# Patient Record
Sex: MALE | Race: WHITE | Employment: FULL TIME | URBAN - METROPOLITAN AREA
[De-identification: names, ages, dates, MRNs, and addresses within clinical notes are randomized per-mention and may not be internally consistent; named-entity substitution may affect disease eponyms.]

---

## 2024-04-30 ENCOUNTER — TELEPHONE (OUTPATIENT)
Dept: PULMONOLOGY | Age: 59
End: 2024-04-30

## 2024-04-30 NOTE — TELEPHONE ENCOUNTER
Note:  Outside referral from Dr. Marta Orellana for Mild Pulmonary Hypertension noted on TTE wo significant LHD. ECHO completed by [facility] cardiology. In house CXR report from 1/29/24 included. Need imaging. Ref scanned to chart. Sending to triage due to age and dx    3/29/2024 TTE:  Left Ventricle Normal left ventricular systolic function with a visually estimated EF of 60 - 65%. Left ventricle size is normal. Mildly increased wall thickness. Normal wall motion. Normal diastolic function.   Left Atrium Left atrium size is normal. LA Vol Index is  21 ml/m2.   Right Ventricle Right ventricle size is normal. Normal systolic function.   Right Atrium Right atrium size is normal.   Aortic Valve Valve structure is normal. No regurgitation. No stenosis.   Mitral Valve Valve structure is normal. Trace regurgitation. No stenosis noted.   Tricuspid Valve Valve structure is normal. Mild regurgitation. The estimated RVSP is 34 mmHg. No stenosis noted. Mildly elevated RVSP, consistent with mild pulmonary hypertension.   Pulmonic Valve Valve structure is normal. No regurgitation. No stenosis noted.   Aorta Normal sized aortic root and ascending aorta.   IVC/Hepatic Veins IVC diameter is less than or equal to 21 mm and decreases greater than 50% during inspiration; therefore the estimated right atrial pressure is normal (~3 mmHg). IVC size is normal.   Pericardium No pericardial effusion.     Left Ventricle: Normal left ventricular systolic function with a visually estimated EF of 60 - 65%. Left ventricle size is normal. Mildly increased wall thickness. Normal wall motion. Normal diastolic function.    Mitral Valve: Trace regurgitation.    Tricuspid Valve: Mild regurgitation. The estimated RVSP is 34 mmHg. Mildly elevated RVSP, consistent with mild pulmonary hypertension.    Image quality is good.     Referral note is scanned in to [facility].  I have faxed request to [facility] to obtain 1/31/2023 Calcium Scoring.  Denisha I spoke

## 2024-07-31 DIAGNOSIS — I27.20 PULMONARY HTN (HCC): Primary | ICD-10-CM

## 2024-08-05 ENCOUNTER — TELEPHONE (OUTPATIENT)
Dept: PULMONOLOGY | Age: 59
End: 2024-08-05

## 2024-08-05 NOTE — TELEPHONE ENCOUNTER
Called and spoke pt regarding the 6mins walk prior to his appt with Ms. Gaby Spencer on 8/7/24. Advice him to come 7:50am, he voices understanding. Clotilde GALAVIZ

## 2024-08-07 ENCOUNTER — OFFICE VISIT (OUTPATIENT)
Dept: PULMONOLOGY | Age: 59
End: 2024-08-07
Payer: COMMERCIAL

## 2024-08-07 ENCOUNTER — NURSE ONLY (OUTPATIENT)
Dept: PULMONOLOGY | Age: 59
End: 2024-08-07

## 2024-08-07 VITALS
WEIGHT: 206 LBS | SYSTOLIC BLOOD PRESSURE: 151 MMHG | TEMPERATURE: 97.2 F | DIASTOLIC BLOOD PRESSURE: 90 MMHG | HEIGHT: 66 IN | OXYGEN SATURATION: 96 % | BODY MASS INDEX: 33.11 KG/M2 | RESPIRATION RATE: 18 BRPM | HEART RATE: 77 BPM

## 2024-08-07 DIAGNOSIS — I27.20 MILD PULMONARY HYPERTENSION (HCC): Primary | ICD-10-CM

## 2024-08-07 DIAGNOSIS — I27.20 PULMONARY HYPERTENSION (HCC): Primary | ICD-10-CM

## 2024-08-07 DIAGNOSIS — R06.83 SNORING: ICD-10-CM

## 2024-08-07 DIAGNOSIS — R06.09 DYSPNEA ON EXERTION: ICD-10-CM

## 2024-08-07 LAB
EXPIRATORY TIME: NORMAL
FEF 25-75% %PRED-PRE: NORMAL
FEF 25-75% PRED: NORMAL
FEF 25-75-PRE: NORMAL
FEV1 %PRED-PRE: 100 %
FEV1 PRED: 3.2 L
FEV1/FVC %PRED-PRE: NORMAL
FEV1/FVC PRED: 113 %
FEV1/FVC: 88 %
FEV1: 3.2 L
FVC %PRED-PRE: 89 %
FVC PRED: 4.08 L
FVC: 3.62 L
PEF %PRED-PRE: NORMAL
PEF PRED: NORMAL
PEF-PRE: NORMAL

## 2024-08-07 PROCEDURE — 99204 OFFICE O/P NEW MOD 45 MIN: CPT | Performed by: STUDENT IN AN ORGANIZED HEALTH CARE EDUCATION/TRAINING PROGRAM

## 2024-08-07 PROCEDURE — 94010 BREATHING CAPACITY TEST: CPT | Performed by: STUDENT IN AN ORGANIZED HEALTH CARE EDUCATION/TRAINING PROGRAM

## 2024-08-07 RX ORDER — AZELASTINE HYDROCHLORIDE 137 UG/1
SPRAY, METERED NASAL
COMMUNITY
Start: 2024-06-27

## 2024-08-07 RX ORDER — OMEPRAZOLE 20 MG/1
CAPSULE, DELAYED RELEASE ORAL DAILY
COMMUNITY
Start: 2024-05-06

## 2024-08-07 RX ORDER — AMLODIPINE AND VALSARTAN 5; 160 MG/1; MG/1
1 TABLET ORAL DAILY
COMMUNITY

## 2024-08-07 ASSESSMENT — PULMONARY FUNCTION TESTS
FVC: 3.62
FEV1/FVC: 88
FEV1: 3.20
FVC_PREDICTED: 4.08
FEV1_PERCENT_PREDICTED_PRE: 100
FEV1/FVC_PREDICTED: 113
FVC_PERCENT_PREDICTED_PRE: 89
FEV1_PREDICTED: 3.20

## 2024-08-07 NOTE — PROGRESS NOTES
of virus then such as COVID or RSV. He has weight fluctuations up and down 20lbs over the last few years. He states he is not the heaviest he has been but definitely not the lightest. He states his wife tells him he snores. He denies any daytime sleepiness. No known SUSY or family hisotry of SUSY. Spirometry was normal. 6MWT was normal at 133% predicted. He denies any swelling or shortness of breath. He and his wife have just started going to the [facility]. He works in equipment sales and his wife is a long time peds ICU RN at [employer] [facility] [employer].          Tobacco Use      Smoking status: Never      Smokeless tobacco: Never    Cocaine use: no  Diet pill use: no  Rapeseed oil use: no  Prior chemotherapy treatment:  no  History of thrombosis, pulmonary or deep vein:  no  History of Liver disease: no  History of congenital heart disease: no  History of valvular heart disease: no  Known SUSY: no/ does snore  Occupation/Hobbies: equipment sales   Second Hand Smoke Exposure: no  Birds: no  Asbestos: no  Radon:  no  TB: No  Hot Tubs/Humidifier: No  Organic/Inorganic Dusts: No  Molds: No      REVIEW OF SYSTEMS:   10 point review of systems is negative except as reported in HPI.    PHYSICAL EXAM:   Vitals:    08/07/24 0815   BP: (!) 151/90   Pulse: 77   Resp: 18   Temp: 97.2 °F (36.2 °C)   TempSrc: Infrared   SpO2: 96%   Weight: 93.4 kg (206 lb)   Height: 1.676 m (5' 6\")     Body mass index is 33.25 kg/m².      General:   Alert, cooperative, no distress, appears stated age.        Eyes/Ears/Nose:   Conjunctivae/corneas clear. PERRL. Nasal mucosa is normal.  Normal TMs and external auditory canals.        Mouth/Throat:  Lips, mucosa, and tongue normal. Teeth and gums normal.        Lungs:     clear throughout; saturation 96% on room air      Heart:   Regular rate and rhythm, S1, S2 normal, no murmur, click, rub or gallop.     Abdomen:    Soft, non-tender.     Extremities:  Extremities normal, atraumatic, no cyanosis or edema.     Skin:

## 2024-08-07 NOTE — PATIENT INSTRUCTIONS
Welcome to [facility]. It was a pleasure meeting you and participating in your health care today!  We will work together to figure out the elevated pulmonary pressures seen on your ECHO.   Your pulmonary hypertension team here at [facility] are Cecilia Benito MD, Gaby Spencer DNP, [facility], and Amanda HARMON CMA. We will work together to care for you. You can reach us at [phone] or through [employer].

## 2024-09-17 ENCOUNTER — HOSPITAL ENCOUNTER (OUTPATIENT)
Dept: SLEEP CENTER | Age: 59
Discharge: HOME | End: 2024-09-20

## 2024-10-03 ENCOUNTER — TELEPHONE (OUTPATIENT)
Dept: SLEEP MEDICINE | Age: 59
End: 2024-10-03

## 2024-10-03 NOTE — TELEPHONE ENCOUNTER
Called pt with HST results and MD recommendations to start CPAP. Pt would like appt to discuss options before starting CPAP.

## 2024-10-04 ENCOUNTER — OFFICE VISIT (OUTPATIENT)
Dept: SLEEP MEDICINE | Age: 59
End: 2024-10-04

## 2024-10-04 VITALS
SYSTOLIC BLOOD PRESSURE: 124 MMHG | DIASTOLIC BLOOD PRESSURE: 80 MMHG | RESPIRATION RATE: 14 BRPM | OXYGEN SATURATION: 96 % | BODY MASS INDEX: 32.95 KG/M2 | HEIGHT: 66 IN | WEIGHT: 205 LBS | HEART RATE: 83 BPM

## 2024-10-04 DIAGNOSIS — G47.34 NOCTURNAL HYPOXEMIA: ICD-10-CM

## 2024-10-04 DIAGNOSIS — G47.33 OSA (OBSTRUCTIVE SLEEP APNEA): Primary | ICD-10-CM

## 2024-10-04 DIAGNOSIS — I27.20 MILD PULMONARY HYPERTENSION (HCC): ICD-10-CM

## 2024-10-04 NOTE — PROGRESS NOTES
times (within 1 hour) every day? 2 = Usually / Always      Satisfaction   Are you satisfied with your sleep?  2 = Usually / Always    Alertness Do you stay awake all day without dozing?  2 = Usually / Always    Timing Are you asleep (or trying to sleep) between 2:00am and 4:00am?    0 = Rarely / Never      Efficiency Do you spend less than 30 minutes awake at night? (This includes the time it takes to fall asleep and awakenings from sleep.)  2 = Usually / Always    Duration  Do you sleep between 6 and 8 hours per day?  2 = Usually / Always    TOTAL  10      Total for all items range from 0-12                 History reviewed. No pertinent past medical history.    Patient Active Problem List   Diagnosis    SUSY (obstructive sleep apnea)    Mild pulmonary hypertension (HCC)    Nocturnal hypoxemia    BMI 33.0-33.9,adult       History reviewed. No pertinent surgical history.    [unfilled]    Social History     Socioeconomic History    Marital status:      Spouse name: Not on file    Number of children: Not on file    Years of education: Not on file    Highest education level: Not on file   Occupational History    Not on file   Tobacco Use    Smoking status: Never    Smokeless tobacco: Never   Substance and Sexual Activity    Alcohol use: Not Currently     Comment: occ    Drug use: Never    Sexual activity: Not on file   Other Topics Concern    Not on file   Social History Narrative    Not on file     Social Determinants of Health     Financial Resource Strain: Not on file   Food Insecurity: Not on file   Transportation Needs: Not on file   Physical Activity: Not on file   Stress: Not on file   Social Connections: Unknown (3/19/2021)    Received from [facility]    Social Connections     Frequency of Communication with Friends and Family: Not asked     Frequency of Social Gatherings with Friends and Family: Not asked   Intimate Partner Violence: Unknown (3/19/2021)    Received from [facility]